# Patient Record
Sex: MALE | Race: WHITE
[De-identification: names, ages, dates, MRNs, and addresses within clinical notes are randomized per-mention and may not be internally consistent; named-entity substitution may affect disease eponyms.]

---

## 2022-10-07 ENCOUNTER — HOSPITAL ENCOUNTER (EMERGENCY)
Dept: HOSPITAL 33 - ED | Age: 72
Discharge: HOME | End: 2022-10-07
Payer: MEDICARE

## 2022-10-07 VITALS — OXYGEN SATURATION: 96 % | DIASTOLIC BLOOD PRESSURE: 89 MMHG | SYSTOLIC BLOOD PRESSURE: 140 MMHG

## 2022-10-07 VITALS — HEART RATE: 74 BPM

## 2022-10-07 DIAGNOSIS — I10: ICD-10-CM

## 2022-10-07 DIAGNOSIS — Z79.899: ICD-10-CM
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
## 2022-10-07 DIAGNOSIS — S61.211A: Primary | ICD-10-CM

## 2022-10-07 PROCEDURE — 99282 EMERGENCY DEPT VISIT SF MDM: CPT

## 2022-10-07 PROCEDURE — 12002 RPR S/N/AX/GEN/TRNK2.6-7.5CM: CPT

## 2022-10-07 PROCEDURE — 90715 TDAP VACCINE 7 YRS/> IM: CPT

## 2022-10-07 PROCEDURE — 90471 IMMUNIZATION ADMIN: CPT

## 2022-10-07 NOTE — ERPHSYRPT
- History of Present Illness


Source: patient


Exam Limitations: no limitations


Patient Subjective Stated Complaint: States he cut his finger today just prior 

to arrival to ED. States he cut it while working on a door; states knife was not

jagged.


Triage Nursing Assessment: Patient ambulated back to ED with a rag wrapped 

around his finger (left hand, 2nd digit). Rag removed and active bleeding noted 

from a 3cm length cut in his finger. CMS to that finger WNL. Area cleansed with 

sterile water and hibicleanse.


Physician History: 





Lac L dorsal 2nd digit per box opener at home. Pt denies other/previous injury. 

He needs a Tdap. Good distal capillary return and sensation. Lac 3.5cm. No other

complaints at this time.


Occurred: just prior to arrival


Method of Injury: incised


Quality: constant, sharpness


Severity of Pain-Max: mild


Severity of Pain-Current: mild


Extremities Pain Location: 2nd finger: left


Modifying Factors: Improves With: nothing, movement


Associated Symptoms: none


Allergies/Adverse Reactions: 








No Known Drug Allergies Allergy (Verified 10/07/22 13:32)


   





Home Medications: 








Ezetimibe 10 mg** [Zetia 10 MG**] 1 tab PO DAILY 10/07/22 [History]


Nebivolol HCl 5 MG*** [Bystolic 5 MG***] 1 tab PO DAILY 10/07/22 [History]


Omeprazole Magnesium 40 mg PO DAILY 10/07/22 [History]





Hx Tetanus, Diphtheria Vaccination/Date Given: Yes (Unsure about tetanus)


Hx Influenza Vaccination/Date Given: Yes


Hx Pneumococcal Vaccination/Date Given: No


Immunizations Up to Date: Yes





Travel Risk





- International Travel


Have you traveled outside of the country in past 3 weeks: No





- Coronavirus Screening


Are you exhibiting any of the following symptoms?: No


Close contact with a COVID-19 positive Pt in past 14-21 Days: No





- Vaccine Status


Have you recieved a Covid-19 vaccination: Yes


: Moderna





- Vaccination Dates


Date of 2cond Vaccination (if applicable): 2021





- Review of Systems


Constitutional: No Symptoms


Eyes: No Symptoms


Ears, Nose, & Throat: No Symptoms


Respiratory: No Symptoms


Cardiac: No Symptoms


Abdominal/Gastrointestinal: No Symptoms


Genitourinary Symptoms: No Symptoms


Skin: No Symptoms


Neurological: No Symptoms


Psychological: No Symptoms


Endocrine: No Symptoms


Hematologic/Lymphatic: No Symptoms


Immunological/Allergic: No Symptoms





- Past Medical History


Pertinent Past Medical History: Yes


Cardiac History: Hypertension


 History: Other


Other Medical History: Kidney Stones





- Past Surgical History


Past Surgical History: Yes


Neuro Surgical History: No Pertinent History


Cardiac: Cardiac Catheterization, Cardiac Stent


Respiratory: No Pertinent History


Gastrointestinal: No Pertinent History


Genitourinary: No Pertinent History


Musculoskeletal: No Pertinent History


Male Surgical History: No Pertinent History


Other Surgical History: Lithotripse





- Social History


Smoking Status: Never smoker


Exposure to second hand smoke: No


Drug Use: none





- Nursing Vital Signs


Nursing Vital Signs: 


                               Initial Vital Signs











Temperature  98 F   10/07/22 13:33


 


Pulse Rate  73   10/07/22 13:33


 


Respiratory Rate  18   10/07/22 13:33


 


Blood Pressure  140/89   10/07/22 13:33


 


O2 Sat by Pulse Oximetry  96   10/07/22 13:33








                                   Pain Scale











Pain Intensity                 0











Hypertensive





- Physical Exam


General Appearance: no apparent distress


Eyes, Ears, Nose, Throat Exam: normal ENT inspection, TMs normal, pharynx 

normal, moist mucous membranes


Neck Exam: normal inspection, non-tender, supple, full range of motion, No 

Brudzinski, No Kernig's, No meningismus


Cardiovascular/Respiratory Exam: normal breath sounds, regular rate/rhythm, 

heart sounds normal


Abdominal Exam: non-tender, soft


Back Exam: normal inspection


Shoulder Exam: normal inspection


Elbow/Forearm Exam: normal inspection


Wrist Exam: normal inspection


Hand Exam: laceration (3.5 lac L 2nd digit dorsally-proximally/Good distal 

capillary return and sensation/FROM)


DTR - Upper Extremity Exam: bicep (R): 2+, bicep (L): 2+


Neuro/Tendon Exam: normal sensation, normal motor functions, normal tendon 

functions, responds to pain, no evidence tendon injury, No motor deficit, No 

sensory deficit


Mental Status Exam: alert, oriented x 3, cooperative


Skin Exam: normal color, warm, dry


**SpO2 Interpretation**: normal


SpO2: 96


O2 Delivery: Room Air





Procedures





- Laceration/Wound Repair


  ** Left Proximal Finger


Time of Procedure: 14:16


Wound Location: Left


Wound Length (cm): 3.5


Wound's Depth, Shape: flap


Wound Explored: clean


Irrigated: Yes


Hibiclens Prep: Yes


Anesthesia: digital block, 1% Lidocaine


Volume Anesthetic (ccs): 4


Wound Repaired With: sutures


Suture Size/Type: 4-0 (4.0 Ethilon x9)


Number of Sutures: 9


Layer Closure?: No


Sterile Dressing Applied?: Yes





- Course


Nursing assessment & vital signs reviewed: Yes


Ordered Tests: 


Medication Summary














Discontinued Medications














Generic Name Dose Route Start Last Admin





  Trade Name Mike  PRN Reason Stop Dose Admin


 


Bacitracin Zinc  0.9 each  10/07/22 14:25  10/07/22 14:30





  Bacitracin Packet 1 Each Pckt  TP  10/07/22 14:26  0.9 each





  STAT ONE   Administration


 


Bacitracin Zinc  Confirm  10/07/22 14:26 





  Bacitracin Packet 1 Each Pckt  Administered  10/07/22 14:27 





  Dose  





  1 each  





  .ROUTE  





  .STK-MED ONE  


 


Diphtheria/Tetanus/Acell Pertussis  0.5 ml  10/07/22 14:24  10/07/22 14:29





  Tdap --Diph,Pertuss(Acell),Tet Vac/Pf 0.5 Ml Vial  IM  10/07/22 14:25  0.5 ml





  .ONCE ONE   Administration


 


Diphtheria/Tetanus/Acell Pertussis  Confirm  10/07/22 14:27 





  Tdap --Diph,Pertuss(Acell),Tet Vac/Pf 0.5 Ml Vial  Administered  10/07/22 

14:28 





  Dose  





  0.5 ml  





  IM  





  .STK-MED ONE  














- Progress


Progress: improved


Progress Note: 





10/07/22 14:22


Tdap given


Counseled pt/family regarding: diagnosis, need for follow-up





- Departure


Departure Disposition: Home


Clinical Impression: 


 Laceration





Condition: Stable


Critical Care Time: No


Referrals: 


[facility] [NON-STAFF PHY W/O PRIVILEGES] - Follow up/PCP as directed


Instructions:  Wound Care (DC), Laceration Repair With Stitches (DC)


Additional Instructions: 


Keep laceration dry for 2 days, then gently wash 1-2 times a day with soap/water


Sutures out in 10 days


Watch for signs of infection-Increasing redness/Pus/Increasing 

swelling/Temperature greater than 100.5